# Patient Record
Sex: FEMALE | Race: WHITE | Employment: OTHER | URBAN - METROPOLITAN AREA
[De-identification: names, ages, dates, MRNs, and addresses within clinical notes are randomized per-mention and may not be internally consistent; named-entity substitution may affect disease eponyms.]

---

## 2019-03-11 ENCOUNTER — MEDICAL CORRESPONDENCE (OUTPATIENT)
Dept: HEALTH INFORMATION MANAGEMENT | Facility: CLINIC | Age: 74
End: 2019-03-11

## 2019-03-11 ENCOUNTER — TRANSFERRED RECORDS (OUTPATIENT)
Dept: HEALTH INFORMATION MANAGEMENT | Facility: CLINIC | Age: 74
End: 2019-03-11

## 2019-03-29 ENCOUNTER — HOSPITAL ENCOUNTER (OUTPATIENT)
Facility: CLINIC | Age: 74
Discharge: HOME | End: 2019-03-29
Attending: INTERNAL MEDICINE | Admitting: INTERNAL MEDICINE
Payer: MEDICARE

## 2019-03-29 VITALS — BODY MASS INDEX: 23.92 KG/M2 | WEIGHT: 135 LBS | HEIGHT: 63 IN

## 2019-03-29 PROCEDURE — 91065 BREATH HYDROGEN/METHANE TEST: CPT | Performed by: INTERNAL MEDICINE

## 2019-03-29 ASSESSMENT — MIFFLIN-ST. JEOR: SCORE: 1096.49

## 2019-03-29 NOTE — OR NURSING
Pt here for HBT. Procedure explained and consent given. Baseline breath obtained prior to pt drinking 25 gms dxylose. Pt tolerated test with 1 episode of diarrhea. Hydrogen levels from 5 ppm at baseline, 31 at 40 min, 44 at 60 min, 106 at 120 min, 148 at 140 miin, 112 at 180 min. Methane levels from 0 ppm at baseline, 1 at 60 min, 3 at 80 min, 3 at 120 mi, 4 at 140 min, 3 at 180 min. Discharge instructions given.Pt will follow up with referring provider for test results.

## 2019-03-29 NOTE — DISCHARGE INSTRUCTIONS
Hydrogen Breath Test Discharge Instructions    1. You may experience diarrhea for the next 12-24 hours.  2. Resume a regular diet.  3. Follow-up with your referring doctor in clinic.  4. If you have questions call [phone] from 7:00am-4:30pm     Rhiannon Blackwood RN